# Patient Record
Sex: MALE | Race: WHITE | NOT HISPANIC OR LATINO | Employment: FULL TIME | URBAN - METROPOLITAN AREA
[De-identification: names, ages, dates, MRNs, and addresses within clinical notes are randomized per-mention and may not be internally consistent; named-entity substitution may affect disease eponyms.]

---

## 2023-04-19 ENCOUNTER — HOSPITAL ENCOUNTER (EMERGENCY)
Facility: HOSPITAL | Age: 53
Discharge: HOME | End: 2023-04-19
Attending: EMERGENCY MEDICINE | Admitting: EMERGENCY MEDICINE
Payer: COMMERCIAL

## 2023-04-19 ENCOUNTER — APPOINTMENT (OUTPATIENT)
Dept: MRI IMAGING | Facility: HOSPITAL | Age: 53
End: 2023-04-19
Payer: COMMERCIAL

## 2023-04-19 VITALS
HEIGHT: 69 IN | RESPIRATION RATE: 16 BRPM | BODY MASS INDEX: 28.57 KG/M2 | DIASTOLIC BLOOD PRESSURE: 78 MMHG | HEART RATE: 94 BPM | WEIGHT: 192.9 LBS | OXYGEN SATURATION: 95 % | TEMPERATURE: 98.1 F | SYSTOLIC BLOOD PRESSURE: 119 MMHG

## 2023-04-19 DIAGNOSIS — M54.2 NECK PAIN: Primary | ICD-10-CM

## 2023-04-19 PROCEDURE — 72141 MRI NECK SPINE W/O DYE: CPT

## 2023-04-19 PROCEDURE — 99282 EMERGENCY DEPT VISIT SF MDM: CPT

## 2023-04-19 RX ORDER — PREDNISONE 20 MG/1
20 TABLET ORAL DAILY
Qty: 5 TABLET | Refills: 0 | Status: SHIPPED | OUTPATIENT
Start: 2023-04-19 | End: 2023-05-01

## 2023-04-19 RX ORDER — TRAMADOL HYDROCHLORIDE 50 MG/1
50 TABLET ORAL EVERY 6 HOURS PRN
Qty: 12 TABLET | Refills: 0 | Status: SHIPPED | OUTPATIENT
Start: 2023-04-19 | End: 2023-05-01

## 2023-04-19 NOTE — ED PROVIDER NOTES
"Subjective    Provider in Triage Note  Patient is 53-year-old male who presents with right-sided neck pain and right arm pain for about 1 week.  Patient denies an injury.  Patient reports pain is worse at rest and that he \"feels fine with movement.\"  Patient reports numbness and tingling in the arm but denies reduced sensation or mobility.  Denies shortness of breath or chest pain.  Denies headache, lightheadedness, or dizziness.  Denies visual changes.  Denies fever, chills or recent illness.  Denies weakness.       History of Present Illness  I interviewed the patient for HPI and agree with the nurse practitioner providing triage note as noted above  Review of Systems    No past medical history on file.    No Known Allergies    No past surgical history on file.    No family history on file.    Social History     Socioeconomic History   • Marital status:            Objective   Physical Exam  Neurologic exam is nonfocal.  Patient has full range of motion of all extremities.  Examination patient's neck shows no C-spine tenderness.  Patient is 2+ distal pulses and is neurovascularly intact.  Procedures           ED Course          MRI Cervical Spine Without Contrast    Result Date: 4/19/2023  1. Moderate to severe central canal stenosis at C6-7, with mild indentation and flattening of the anterior cervical spinal cord, without cord edema. 2. Severe left neural foraminal stenosis at C3-4. Correlate radiculopathy symptoms in this distribution. 3. Varying degrees of neural foraminal stenosis elsewhere within the cervical spine. Please refer to the body of the report for level by level findings. Electronically Signed: Claudia Vasquez  4/19/2023 2:03 PM EDT  Workstation ID: [id number]                                      Medical Decision Making  Results of the patient's MRI scan of his neck are as noted above from the radiologist.  Patient be discharged.  Patient be placed on prednisone and Ultram.  I did speak to the " on-call neurosurgeon for follow-up.    Amount and/or Complexity of Data Reviewed  Radiology: ordered and independent interpretation performed.      Risk  Prescription drug management.          Final diagnoses:   Neck pain       ED Disposition  ED Disposition     ED Disposition   Discharge    Condition   Stable    Comment   --             No follow-up provider specified.       Medication List      New Prescriptions    predniSONE 20 MG tablet  Commonly known as: DELTASONE  Take 1 tablet by mouth Daily.     traMADol 50 MG tablet  Commonly known as: ULTRAM  Take 1 tablet by mouth Every 6 (Six) Hours As Needed for Severe Pain or Moderate Pain.           Where to Get Your Medications      These medications were sent to [id number] - [place], [place] - [address] AT [address] OF Replaced by [facility] - [phone]  - [phone]   [address] [place] [address]    Phone: [phone]   · predniSONE 20 MG tablet  · traMADol 50 MG tablet          Kermit Hurst MD  04/19/23 4552

## 2023-04-19 NOTE — Clinical Note
[facility] EMERGENCY DEPARTMENT  [address] [place] [address]  Phone: [phone]    Esequiel Becker was seen and treated in our emergency department on 4/19/2023.  He may return to work on 04/20/2023.         Thank you for choosing [facility].    Xin Murguia RN

## 2023-04-19 NOTE — ED NOTES
Pt. Presents with mother for c/o Right sided neck pain at a #7 with rest that radiates down right arm that began 4 days after lifting boxes at home.

## 2023-04-28 NOTE — PROGRESS NOTES
"Subjective   History of Present Illness: Esequiel Becker is a 53 y.o. male is being seen for consultation today at the request of Kermit Hurst MD for neck pain. Today patient reports right arm and neck pain as well as right shoulder pain.  Patient has a 3-week history of neck pain as well as pain that will radiate down his right upper extremity and into his second third and fourth digits.  This can be associated with numbness and tingling.  Patient was seen in the ER a couple weeks ago for this.  Pain is quite significant and causing him issues.  He has not tried any physical therapy or injections.  He has been taking over-the-counter pain medication.  No difficulty using his hands or dropping things.  No balance issues.  Chief Complaint   Patient presents with   • Neck Pain     New evaluation          Previous Treatment: Ibuprofen, Tylenol    The following portions of the patient's history were reviewed and updated as appropriate: allergies, current medications, past family history, past medical history, past social history, past surgical history and problem list.    Review of Systems   HENT: Negative.    Eyes: Negative.    Respiratory: Negative.    Cardiovascular: Negative.    Gastrointestinal: Negative.    Endocrine: Negative.    Genitourinary: Negative.    Musculoskeletal: Positive for arthralgias, myalgias, neck pain and neck stiffness.        Right arm   Skin: Negative.    Allergic/Immunologic: Negative.    Neurological: Positive for weakness and numbness (tingling).   Hematological: Negative.    Psychiatric/Behavioral: Positive for sleep disturbance.       Objective     /74   Pulse 82   Ht 175.3 cm (69\")   Wt 85.8 kg (189 lb 3.2 oz)   BMI 27.94 kg/m²    Body mass index is 27.94 kg/m².  Vitals:    05/01/23 1314   PainSc:   8           Neurologic Exam     Mental Status   Oriented to person, place, and time.     Motor Exam     Strength   Strength 5/5 throughout.     Sensory Exam   Light touch normal. "     Gait, Coordination, and Reflexes     Reflexes   Right Bazzi: absent  Left Bazzi: absent      Assessment & Plan   Independent Review of Radiographic Studies:      I personally reviewed and interpreted the images from the following studies.    MRI cervical spine: Left paracentral disc herniation at C3-4 causing moderate central stenosis and severe foraminal stenosis.  There is also significant degenerative changes at C6-7 with moderate to severe central stenosis and foraminal stenosis.  No other high-grade central or foraminal stenosis    Medical Decision Making:      Esequiel Becker is a 53 y.o. male with a 3-week history of neck pain and cervical radiculopathy with degenerative changes on MRI most severe at C6-7 where there is central and foraminal stenosis.  No urgent neurosurgical intervention is indicated at this time.  I recommend that the patient begin physical therapy as well as undergo cervical ERNESTO.  I think that his symptoms will likely resolve over the course of time.  Should his symptoms continue or worsen despite conservative measures he can follow-up with me.        Diagnoses and all orders for this visit:    1. Cervical radiculopathy (Primary)    2. Cervical stenosis of spine      No follow-ups on file.    This patient was examined wearing appropriate personal protective equipment.                      Dr. Rk Langley IV    05/01/23  13:38 EDT

## 2023-04-28 NOTE — PROGRESS NOTES
"Subjective   History of Present Illness: Esequiel Becker is a 53 y.o. male is here today for follow-up.    No chief complaint on file.         Previous Treatment:    The following portions of the patient's history were reviewed and updated as appropriate: {history reviewed:20406::\"allergies\",\"current medications\",\"past family history\",\"past medical history\",\"past social history\",\"past surgical history\",\"problem list\"}.    Review of Systems    Objective     There were no vitals taken for this visit.   There is no height or weight on file to calculate BMI.  There were no vitals filed for this visit.        Neurologic Exam    Assessment & Plan   Independent Review of Radiographic Studies:      I personally reviewed and interpreted the images from the following studies.    ***    Medical Decision Making:      Esequiel Becker is a 53 y.o. male       There are no diagnoses linked to this encounter.  No follow-ups on file.    This patient was examined wearing appropriate personal protective equipment.                      Dr. Rk Langley IV    04/28/23  09:39 EDT  "

## 2023-05-01 ENCOUNTER — OFFICE VISIT (OUTPATIENT)
Dept: NEUROSURGERY | Facility: CLINIC | Age: 53
End: 2023-05-01
Payer: COMMERCIAL

## 2023-05-01 VITALS
HEART RATE: 82 BPM | WEIGHT: 189.2 LBS | BODY MASS INDEX: 28.02 KG/M2 | HEIGHT: 69 IN | DIASTOLIC BLOOD PRESSURE: 74 MMHG | SYSTOLIC BLOOD PRESSURE: 132 MMHG

## 2023-05-01 DIAGNOSIS — M48.02 CERVICAL STENOSIS OF SPINE: ICD-10-CM

## 2023-05-01 DIAGNOSIS — M54.12 CERVICAL RADICULOPATHY: Primary | ICD-10-CM

## 2023-05-01 PROCEDURE — 99204 OFFICE O/P NEW MOD 45 MIN: CPT | Performed by: NEUROLOGICAL SURGERY

## 2023-05-03 ENCOUNTER — TELEPHONE (OUTPATIENT)
Dept: NEUROSURGERY | Facility: CLINIC | Age: 53
End: 2023-05-03
Payer: COMMERCIAL

## 2023-05-03 NOTE — TELEPHONE ENCOUNTER
Patient called the office today asking when he will be scheduled for his injections. I informed him that it sometimes takes a little bit, but as soon as they get the approval that they will call him to schedule his appointment. He is also complaining of pain and is wanting to know if there is anything that he can take to help. I informed him that I would call him back after I heard back from Dr. Langley.

## 2023-05-04 RX ORDER — METHYLPREDNISOLONE 4 MG/1
TABLET ORAL
Qty: 21 TABLET | Refills: 0 | Status: SHIPPED | OUTPATIENT
Start: 2023-05-04

## 2023-05-04 NOTE — TELEPHONE ENCOUNTER
Patient called the office following up on his call yesterday. I stated that we sent in a steroid pack to help with his pain. He was very grateful and understood.

## 2023-05-09 ENCOUNTER — TELEPHONE (OUTPATIENT)
Dept: NEUROSURGERY | Facility: CLINIC | Age: 53
End: 2023-05-09
Payer: COMMERCIAL

## 2023-05-09 RX ORDER — METHYLPREDNISOLONE 4 MG/1
TABLET ORAL
Qty: 21 TABLET | Refills: 0 | Status: SHIPPED | OUTPATIENT
Start: 2023-05-09

## 2023-05-09 NOTE — TELEPHONE ENCOUNTER
Called patient to let him know we sent his steroid pack to [facility] pharmacy on [address] like requested. Patient lost the call.

## 2023-05-09 NOTE — TELEPHONE ENCOUNTER
Patient called the office returning my call. I informed him that his medication was sent to [facility] on [address]

## 2023-05-09 NOTE — TELEPHONE ENCOUNTER
Patient called the office stating that his insurance will not cover his prescription at [facility]. He asked if we could switch his pharmacy for this medication to [facility]. I informed him that I would get that sent over as soon as possible.

## 2023-05-19 RX ORDER — MELOXICAM 15 MG/1
15 TABLET ORAL DAILY
Qty: 30 TABLET | Refills: 0 | Status: SHIPPED | OUTPATIENT
Start: 2023-05-19

## 2023-05-23 ENCOUNTER — TELEPHONE (OUTPATIENT)
Dept: NEUROSURGERY | Facility: CLINIC | Age: 53
End: 2023-05-23
Payer: COMMERCIAL

## 2023-05-23 DIAGNOSIS — M54.12 CERVICAL RADICULOPATHY: Primary | ICD-10-CM

## 2023-05-23 NOTE — TELEPHONE ENCOUNTER
Patient called the office stating that he is having pain and had to call off work. Neck pain that goes down bilateral arms down into his hands. Patient is wanting to be seen today and wants a doctors note.

## 2023-05-23 NOTE — TELEPHONE ENCOUNTER
Patient called the office again to check the status of being seen and a work note, advised the patient that per Dr. Langley's last OV he ordered ERNESTO & PT, those will need to be completed before we seen him back. I also let him know that we don't prescribe anything for pain unless they have had surgery and then it is a short course, he has had 2 recent MDP and prednisone within 60 days. He would like referral for an evaluation with pain mgmt physician for possible pain medication. Told him Dr. Langley is in surgery and we will let him know once he answers us back.

## 2023-05-25 ENCOUNTER — TREATMENT (OUTPATIENT)
Dept: PHYSICAL THERAPY | Facility: CLINIC | Age: 53
End: 2023-05-25
Payer: COMMERCIAL

## 2023-05-25 DIAGNOSIS — M54.12 CERVICAL RADICULOPATHY: Primary | ICD-10-CM

## 2023-05-25 DIAGNOSIS — M54.2 PAIN, NECK: ICD-10-CM

## 2023-05-25 PROCEDURE — 97110 THERAPEUTIC EXERCISES: CPT | Performed by: PHYSICAL THERAPIST

## 2023-05-25 PROCEDURE — 97162 PT EVAL MOD COMPLEX 30 MIN: CPT | Performed by: PHYSICAL THERAPIST

## 2023-05-25 PROCEDURE — 97035 APP MDLTY 1+ULTRASOUND EA 15: CPT | Performed by: PHYSICAL THERAPIST

## 2023-05-25 NOTE — PROGRESS NOTES
Physical Therapy Initial Evaluation and Plan of Care    Patient: Esequiel Becker   : 1970  Diagnosis/ICD-10 Code:  Cervical radiculopathy [M54.12]  Referring practitioner: Rk Langley IV, MD  Date of Initial Visit: 2023  Today's Date: 2023  Patient seen for 1 sessions           Subjective Questionnaire: NDI:48%      Subjective Evaluation    History of Present Illness  Mechanism of injury: Pt is referred to therapy due to neck pain and R UE symptoms. Pt co pain in R side of the neck , UTs/ R shoulder , posterior upper arm, elbow, down to the arm to the fingers.    Onset of symptoms: about 6 weeks ago    Aggravating factors: sleeping, unable to get comfortable at night, any type of physical activities, over head activities, work related activities, Turning his head    Relieving factors: nothing so far    Functional limitation: yard work, work, activities he needs to do around the house.    PMH: unremarkable          Patient Occupation: drives a fork lift Quality of life: good    Pain  Current pain ratin  At best pain ratin  At worst pain ratin  Progression: worsening    Social Support  Lives with: significant other    Hand dominance: right    Diagnostic Tests  MRI studies: abnormal    Treatments  Current treatment: medication  Patient Goals  Patient goals for therapy: decreased pain, increased motion, increased strength and return to sport/leisure activities           Precautions:     Objective          Postural Observations  Seated posture: fair  Standing posture: fair  Correction of posture: makes symptoms better    Additional Postural Observation Details  Presents with rounded shoulders/ FHP/ slump sitting posture    Palpation     Right Tenderness of the cervical paraspinals, levator scapulae, rhomboids and upper trapezius.     Active Range of Motion   Cervical/Thoracic Spine   Cervical    Flexion: WFL and with pain  Extension: WFL and with pain  Left lateral flexion: WFL and with  pain  Right lateral flexion: WFL and with pain  Left rotation: WFL and with pain  Right rotation: WFL and with pain    Strength/Myotome Testing   Cervical Spine     Left   Normal strength    Right   Normal strength    Left Wrist/Hand      (2nd hand position)   Left  strength (2nd hand position) 85 lbs    Right Wrist/Hand      (2nd hand position)   Right  strength (2nd hand position) 70 lbs    Tests   Cervical     Right   Positive cervical distraction and Spurling's sign.           Assessment & Plan     Assessment  Impairments: abnormal or restricted ROM, activity intolerance, lacks appropriate home exercise program and pain with function  Functional Limitations: carrying objects, lifting, sleeping, pulling, pushing, uncomfortable because of pain, sitting, reaching overhead and unable to perform repetitive tasks  Assessment details: Pt is a 53 y.o. male referred to therapy due to R sided neck pain and R UE radiculopathy. . Pt presents with impaired spinal mobility, increase pain with activities and use of R UE.   Upon initial evaluation pt exhibits the above impairments and functional limitations. Impairments affect sleeping, performing his normal /daily activities, and performing his work related activities.     Pt is a good candidate for rehab and will benefit from skilled physical therapy to address impairments, resolve pain and maximize function.    Prognosis: good    Goals  Plan Goals: STGs:  In 4 weeks    1- Pt will  report at least 25 % improvement in functional mobility and pain reduction   2- Pt will be independent with initial HEP   3- Pt will tolerate progression of his exercise program and HEP without increase symptoms  4- Pt will have less UE symptoms      LTGs: By DC     1- Pt will report at least 75 % improvement in functional mobility and pain reduction   2- Pt will be independent with final HEP and self management of his condition   3- Pt will have improved NDI score indicating  functional improvement and symptom reduction   4- Pt will voice readiness for DC with independent program   5- Pt will demonstrate improved posture and body mechanics   6- R UE symptoms will be resolved        Plan  Therapy options: will be seen for skilled therapy services  Planned modality interventions: high voltage pulsed current (pain management), ultrasound and traction  Planned therapy interventions: body mechanics training, functional ROM exercises, home exercise program, manual therapy, neuromuscular re-education, postural training, strengthening and therapeutic activities  Frequency: 1x week  Duration in weeks: 12  Treatment plan discussed with: patient  Plan details: Pt can only come to therapy 1xwk due to his work schedule        See flow sheet for treatment detail    History # of Personal Factors and/or Comorbidities: MODERATE (1-2)  Examination of Body System(s): # of elements: MODERATE (3)  Clinical Presentation: EVOLVING  Clinical Decision Making: MODERATE          Timed:         Manual Therapy:         mins  15573;     Therapeutic Exercise:   10     mins  02316;     Neuromuscular Hu:        mins  31054;    Therapeutic Activity:          mins  87612;     Gait Training:           mins  83374;     Ultrasound:    10      mins  20350;    Ionto                                   mins   39612  Self Care                       6    mins   87221        Un-Timed:  Electrical Stimulation:         mins  03403 ( );  Dry Needling          mins self-pay  Traction          mins 16959  Canal repositioning           mins    94868  Low Eval          Mins  55508  Mod Eval    30      Mins  87552  High Eval                            Mins  76293  Re-Eval                               mins  69166        Timed Treatment: 26     mins   Total Treatment:     56   mins    PT SIGNATURE: Franki Martinez PT, CLT  [place] License: # [id number]     DATE TREATMENT INITIATED: 5/25/2023    Initial Certification  Certification  Period: 5/25/2023 thru 8/22/2023  I certify that the therapy services are furnished while this patient is under my care.  The services outlined above are required by this patient, and will be reviewed every 90 days.     PHYSICIAN: Rk Langley IV, MD   NPI: [id number]                                      DATE:     Please sign and return via fax to [phone].. Thank you, [facility].

## 2023-06-03 ENCOUNTER — LAB (OUTPATIENT)
Dept: LAB | Facility: HOSPITAL | Age: 53
End: 2023-06-03
Payer: COMMERCIAL

## 2023-06-03 ENCOUNTER — TRANSCRIBE ORDERS (OUTPATIENT)
Dept: ADMINISTRATIVE | Facility: HOSPITAL | Age: 53
End: 2023-06-03
Payer: COMMERCIAL

## 2023-06-03 DIAGNOSIS — E78.5 HYPERLIPIDEMIA, UNSPECIFIED HYPERLIPIDEMIA TYPE: Primary | ICD-10-CM

## 2023-06-03 DIAGNOSIS — Z12.5 SCREENING FOR PROSTATE CANCER: ICD-10-CM

## 2023-06-03 DIAGNOSIS — R73.9 HYPERGLYCEMIA: ICD-10-CM

## 2023-06-03 DIAGNOSIS — E78.5 HYPERLIPIDEMIA, UNSPECIFIED HYPERLIPIDEMIA TYPE: ICD-10-CM

## 2023-06-03 LAB
ALBUMIN SERPL-MCNC: 4.9 G/DL (ref 3.5–5.2)
ALBUMIN UR-MCNC: 4.3 MG/DL
ALBUMIN/GLOB SERPL: 2.3 G/DL
ALP SERPL-CCNC: 55 U/L (ref 39–117)
ALT SERPL W P-5'-P-CCNC: 26 U/L (ref 1–41)
ANION GAP SERPL CALCULATED.3IONS-SCNC: 11 MMOL/L (ref 5–15)
AST SERPL-CCNC: 18 U/L (ref 1–40)
BASOPHILS # BLD AUTO: 0.08 10*3/MM3 (ref 0–0.2)
BASOPHILS NFR BLD AUTO: 1.1 % (ref 0–1.5)
BILIRUB SERPL-MCNC: 0.2 MG/DL (ref 0–1.2)
BUN SERPL-MCNC: 23 MG/DL (ref 6–20)
BUN/CREAT SERPL: 20.9 (ref 7–25)
CALCIUM SPEC-SCNC: 9.6 MG/DL (ref 8.6–10.5)
CHLORIDE SERPL-SCNC: 110 MMOL/L (ref 98–107)
CHOLEST SERPL-MCNC: 134 MG/DL (ref 0–200)
CO2 SERPL-SCNC: 28 MMOL/L (ref 22–29)
CREAT SERPL-MCNC: 1.1 MG/DL (ref 0.76–1.27)
CREAT UR-MCNC: 329 MG/DL
DEPRECATED RDW RBC AUTO: 42 FL (ref 37–54)
EGFRCR SERPLBLD CKD-EPI 2021: 80.8 ML/MIN/1.73
EOSINOPHIL # BLD AUTO: 0.34 10*3/MM3 (ref 0–0.4)
EOSINOPHIL NFR BLD AUTO: 4.5 % (ref 0.3–6.2)
ERYTHROCYTE [DISTWIDTH] IN BLOOD BY AUTOMATED COUNT: 12.3 % (ref 12.3–15.4)
GLOBULIN UR ELPH-MCNC: 2.1 GM/DL
GLUCOSE SERPL-MCNC: 93 MG/DL (ref 65–99)
HBA1C MFR BLD: 5.6 % (ref 4.8–5.6)
HCT VFR BLD AUTO: 45.1 % (ref 37.5–51)
HDLC SERPL-MCNC: 30 MG/DL (ref 40–60)
HGB BLD-MCNC: 15.3 G/DL (ref 13–17.7)
IMM GRANULOCYTES # BLD AUTO: 0.01 10*3/MM3 (ref 0–0.05)
IMM GRANULOCYTES NFR BLD AUTO: 0.1 % (ref 0–0.5)
LDLC SERPL CALC-MCNC: 72 MG/DL (ref 0–100)
LDLC/HDLC SERPL: 2.2 {RATIO}
LYMPHOCYTES # BLD AUTO: 1.95 10*3/MM3 (ref 0.7–3.1)
LYMPHOCYTES NFR BLD AUTO: 25.8 % (ref 19.6–45.3)
MCH RBC QN AUTO: 31.4 PG (ref 26.6–33)
MCHC RBC AUTO-ENTMCNC: 33.9 G/DL (ref 31.5–35.7)
MCV RBC AUTO: 92.6 FL (ref 79–97)
MICROALBUMIN/CREAT UR: 13.1 MG/G
MONOCYTES # BLD AUTO: 0.8 10*3/MM3 (ref 0.1–0.9)
MONOCYTES NFR BLD AUTO: 10.6 % (ref 5–12)
NEUTROPHILS NFR BLD AUTO: 4.37 10*3/MM3 (ref 1.7–7)
NEUTROPHILS NFR BLD AUTO: 57.9 % (ref 42.7–76)
NRBC BLD AUTO-RTO: 0 /100 WBC (ref 0–0.2)
PLATELET # BLD AUTO: 316 10*3/MM3 (ref 140–450)
PMV BLD AUTO: 9.3 FL (ref 6–12)
POTASSIUM SERPL-SCNC: 5.1 MMOL/L (ref 3.5–5.2)
PROT SERPL-MCNC: 7 G/DL (ref 6–8.5)
PSA SERPL-MCNC: 1.1 NG/ML (ref 0–4)
RBC # BLD AUTO: 4.87 10*6/MM3 (ref 4.14–5.8)
SODIUM SERPL-SCNC: 149 MMOL/L (ref 136–145)
TRIGL SERPL-MCNC: 190 MG/DL (ref 0–150)
VLDLC SERPL-MCNC: 32 MG/DL (ref 5–40)
WBC NRBC COR # BLD: 7.55 10*3/MM3 (ref 3.4–10.8)

## 2023-06-03 PROCEDURE — 80053 COMPREHEN METABOLIC PANEL: CPT

## 2023-06-03 PROCEDURE — 80061 LIPID PANEL: CPT

## 2023-06-03 PROCEDURE — 82570 ASSAY OF URINE CREATININE: CPT

## 2023-06-03 PROCEDURE — 83036 HEMOGLOBIN GLYCOSYLATED A1C: CPT

## 2023-06-03 PROCEDURE — 82043 UR ALBUMIN QUANTITATIVE: CPT

## 2023-06-03 PROCEDURE — G0103 PSA SCREENING: HCPCS

## 2023-06-03 PROCEDURE — 85025 COMPLETE CBC W/AUTO DIFF WBC: CPT

## 2023-06-03 PROCEDURE — 36415 COLL VENOUS BLD VENIPUNCTURE: CPT

## 2023-06-06 ENCOUNTER — OFFICE VISIT (OUTPATIENT)
Dept: PAIN MEDICINE | Facility: CLINIC | Age: 53
End: 2023-06-06
Payer: COMMERCIAL

## 2023-06-06 VITALS
RESPIRATION RATE: 16 BRPM | DIASTOLIC BLOOD PRESSURE: 82 MMHG | HEART RATE: 86 BPM | OXYGEN SATURATION: 96 % | SYSTOLIC BLOOD PRESSURE: 140 MMHG

## 2023-06-06 DIAGNOSIS — G89.4 CHRONIC PAIN SYNDROME: Primary | ICD-10-CM

## 2023-06-06 DIAGNOSIS — M54.12 CERVICAL RADICULITIS: ICD-10-CM

## 2023-06-06 DIAGNOSIS — M47.812 CERVICAL SPONDYLOSIS WITHOUT MYELOPATHY: ICD-10-CM

## 2023-06-06 DIAGNOSIS — M79.18 MYOFASCIAL PAIN SYNDROME: ICD-10-CM

## 2023-06-06 PROCEDURE — 99204 OFFICE O/P NEW MOD 45 MIN: CPT | Performed by: ANESTHESIOLOGY

## 2023-06-06 RX ORDER — DICLOFENAC SODIUM 75 MG/1
75 TABLET, DELAYED RELEASE ORAL 2 TIMES DAILY
Qty: 60 TABLET | Refills: 1 | Status: SHIPPED | OUTPATIENT
Start: 2023-06-06

## 2023-06-06 RX ORDER — GABAPENTIN 300 MG/1
300 CAPSULE ORAL 3 TIMES DAILY PRN
Qty: 90 CAPSULE | Refills: 1 | Status: SHIPPED | OUTPATIENT
Start: 2023-06-06

## 2023-06-06 NOTE — PROGRESS NOTES
Subjective   CC neck pain, right arm pain  Esequiel Becker is a 53 y.o. male with chronic neck pain here for initial evaluation.  Referred by neurosurgery.  Complains of 6 weeks of worsening neck pain mostly right-sided radiating to right arm with burning tingling numbness in the arm constant but worse with any activity.  Denies balance issues, bladder bowel continence.  He has tried anti-inflammatories muscle relaxers without relief.  Tried physical therapy, home exercise program with marginal relief so far.  Pain significantly impairing ADL, interfering with work.  Seen neurosurgery recommended physical therapy and injection first.    Imaging reviewed  C-spine MRI : moderate to severe central canal stenosis at C6-7, with mild indentation and flattening of the anterior cervical spinal cord, without cord edema. Severe left neural foraminal stenosis at C3-4. Correlate radiculopathy symptoms in this distribution. Varying degrees of neural foraminal stenosis elsewhere within the cervical spine. Please refer to the body of the report for level by level findings.    Pain Assessment   Location of Pain: Right arm neck pain, joint  Description of Pain: Dull/Aching, Throbbing, Stabbing  Previous Pain Rating :7  Current Pain Ratin  Aggravating Factors: Activity  Alleviating Factors: Rest, Medication  Pain onset over 12 weeks  Interferes with ADL's.   [place] back pain disability scale on chart    PEG Assessment   What number best describes your pain on average in the past week?7  What number best describes how, during the past week, pain has interfered with your enjoyment of life?4  What number best describes how, during the past week, pain has interfered with your general activity?10      The following portions of the patient's history were reviewed and updated as appropriate: allergies, current medications, past family history, past medical history, past social history, past surgical history and problem list.      has a past medical history of Neck pain (04/15/23), Shoulder pain, right, and Spinal stenosis (04/15/23).   has a past surgical history that includes Brain surgery (04/13/01).  family history includes Arthritis in his mother; COPD in his maternal grandfather.  Social History     Tobacco Use    Smoking status: Every Day     Types: Electronic Cigarette    Smokeless tobacco: Not on file   Substance Use Topics    Alcohol use: Not Currently       Review of Systems    Objective   Physical Exam  Vitals reviewed.   Constitutional:       General: He is not in acute distress.  Pulmonary:      Effort: Pulmonary effort is normal.   Musculoskeletal:      Cervical back: Spasms and tenderness present. Decreased range of motion.      Comments: Positive per Spurling     /82   Pulse 86   Resp 16   SpO2 96%     PHQ 9 on chart  Opioid risk tool low risk      Assessment & Plan   Diagnoses and all orders for this visit:    1. Chronic pain syndrome (Primary)  -     diclofenac (VOLTAREN) 75 MG EC tablet; Take 1 tablet by mouth 2 (Two) Times a Day.  Dispense: 60 tablet; Refill: 1  -     gabapentin (NEURONTIN) 300 MG capsule; Take 1 capsule by mouth 3 (Three) Times a Day As Needed (pain).  Dispense: 90 capsule; Refill: 1    2. Cervical spondylosis without myelopathy    3. Cervical radiculitis  -     Epidural Block  -     Epidural Block  -     gabapentin (NEURONTIN) 300 MG capsule; Take 1 capsule by mouth 3 (Three) Times a Day As Needed (pain).  Dispense: 90 capsule; Refill: 1    4. Myofascial pain syndrome      Summary  Esequiel Becker is a 53 y.o. male with chronic neck pain here for initial evaluation.  Referred by neurosurgery.  Complains of 6 weeks of worsening neck pain mostly right-sided radiating to right arm with burning tingling numbness in the arm constant but worse with any activity.  Denies balance issues, bladder bowel continence.  He has tried anti-inflammatories muscle relaxers without relief.  Tried physical  therapy, home exercise program with marginal relief so far.  Pain significantly impairing ADL, interfering with work.  Seen neurosurgery recommended physical therapy and injection first.    Worsening neck pain from DDD spondylosis stenosis with radicular pain.  Marginal relief with medication and physical therapy/home exercise program.  We will schedule for cervical ERNESTO x2.  Risks and benefits discussed.    Diclofenac and gabapentin.    RTC for procedure      Note is dictated utilizing voice recognition software. Unfortunately this leads to occasional typographical errors. I apologize in advance if the situation occurs. If questions occur please do not hesitate to call our office.

## 2023-06-08 ENCOUNTER — TREATMENT (OUTPATIENT)
Dept: PHYSICAL THERAPY | Facility: CLINIC | Age: 53
End: 2023-06-08
Payer: COMMERCIAL

## 2023-06-08 DIAGNOSIS — M54.12 CERVICAL RADICULOPATHY: Primary | ICD-10-CM

## 2023-06-08 DIAGNOSIS — M54.2 PAIN, NECK: ICD-10-CM

## 2023-06-08 NOTE — PROGRESS NOTES
Physical Therapy Daily Treatment Note    [address]  [place] [address]  [phone]    Patient: Esequiel Becker  : 1970  Referring practitioner: Rk Langley IV, MD  Diagnosis/ ICD10 code: Cervical radiculopathy [M54.12]  Today's Date: 2023    VISIT#: 2    Subjective   Pt reports: went to pain management on Tuesday and she put him on Gabapentin it has helped. Sleeping better at night. She also scheduled him for injections in July. The US felt really good , felt better that day and that night. Presently having pain R Upper trapz/ shld blade and R elbow.       Objective     See Exercise, Manual, and Modality Logs for complete treatment. Review of his HEP,  postural ed , continued with US and manual therapy.      Patient Education:    Assessment & Plan     Assessment    Assessment details: Pt tolerated today's rx session well. He was uncomfortable in supine position during manual traction, co pain in R shoulder blade and R arm while in that position. Manual traction helped.         Progress per Plan of Care            Timed:         Manual Therapy:   12      mins  74430;     Therapeutic Exercise:     14    mins  29828;     Neuromuscular Hu:        mins  27967;    Therapeutic Activity:          mins  77992;     Gait Training:           mins  50437;     Ultrasound:      12    mins  24498;    Ionto                                   mins   01749  Self Care                            mins   46920      Un-Timed:  Electrical Stimulation:         mins  17411 ( );  Traction          mins 49176  Canal repositioning           mins    95488        Timed Treatment:   38   mins   Total Treatment:     38   mins    Franki Martinez PT, CLT  Physical Therapist  [place] License:  # [id number]

## 2023-06-16 ENCOUNTER — TREATMENT (OUTPATIENT)
Dept: PHYSICAL THERAPY | Facility: CLINIC | Age: 53
End: 2023-06-16
Payer: COMMERCIAL

## 2023-06-16 DIAGNOSIS — M54.12 CERVICAL RADICULOPATHY: Primary | ICD-10-CM

## 2023-06-16 DIAGNOSIS — M54.2 PAIN, NECK: ICD-10-CM

## 2023-06-16 PROCEDURE — 97140 MANUAL THERAPY 1/> REGIONS: CPT | Performed by: PHYSICAL THERAPIST

## 2023-06-16 PROCEDURE — 97035 APP MDLTY 1+ULTRASOUND EA 15: CPT | Performed by: PHYSICAL THERAPIST

## 2023-06-16 NOTE — PROGRESS NOTES
Physical Therapy Daily Treatment Note     [facility], [address]  [place] [address]  [phone]    Patient: Esequiel Becker  : 1970  Referring practitioner: Rk Langley IV, MD  Diagnosis/ ICD10 code: Cervical radiculopathy [M54.12]  Today's Date: 2023    VISIT#: 3    Subjective   Pt reports: is not doing too bad today, has bad and good days. Yesterday was really bad the pain was going down the R arm . Presently rates his pain 5/10 in the R shoulder blade. US feels really good .       Objective     See Exercise, Manual, and Modality Logs for complete treatment. Continued with US and manual therapy. Review of his HEP.     Patient Education:    Assessment & Plan     Assessment    Assessment details: Pt reported decrease pain in R shoulder blade after the manual therapy. Demonstrates understanding of his HEP.         Progress per Plan of Care            Timed:         Manual Therapy:   12     mins  52147;     Therapeutic Exercise:     5    mins  30698;     Neuromuscular Hu:        mins  95600;    Therapeutic Activity:          mins  23499;     Gait Training:           mins  39140;     Ultrasound:     12     mins  16227;    Ionto                                   mins   03957  Self Care                            mins   90927      Un-Timed:  Electrical Stimulation:         mins  96210 ( );  Traction          mins 70879  Canal repositioning           mins    05650        Timed Treatment:  29    mins   Total Treatment:     29   mins    Franki Martinez PT, CLT  Physical Therapist  [place] License:  # [id number]

## 2023-07-21 ENCOUNTER — TELEPHONE (OUTPATIENT)
Dept: PHYSICAL THERAPY | Facility: CLINIC | Age: 53
End: 2023-07-21

## 2023-07-28 ENCOUNTER — TREATMENT (OUTPATIENT)
Dept: PHYSICAL THERAPY | Facility: CLINIC | Age: 53
End: 2023-07-28
Payer: COMMERCIAL

## 2023-07-28 DIAGNOSIS — M54.2 PAIN, NECK: ICD-10-CM

## 2023-07-28 DIAGNOSIS — M54.12 CERVICAL RADICULOPATHY: Primary | ICD-10-CM

## 2023-07-28 PROCEDURE — 97140 MANUAL THERAPY 1/> REGIONS: CPT | Performed by: PHYSICAL THERAPIST

## 2023-07-28 PROCEDURE — 97035 APP MDLTY 1+ULTRASOUND EA 15: CPT | Performed by: PHYSICAL THERAPIST

## 2023-07-28 NOTE — PROGRESS NOTES
Physical Therapy Daily Treatment Note    [address] [facility], [address]  [place] [address]  [phone]    Patient: Esequiel Becker  : 1970  Referring practitioner: Rk Langley IV, MD  Diagnosis/ ICD10 code: Cervical radiculopathy [M54.12]  Today's Date: 2023    VISIT#: 7    Subjective   Pt reports: doing a lot better since he started therapy. Having his 2nd epidural next Friday. Some days he doesn't have any pain at all but occasionally may have some symptoms in R hand and elbow.       Objective     See Exercise, Manual, and Modality Logs for complete treatment.     Patient Education:    Assessment & Plan     Assessment    Assessment details: Overall doing better and responding to therapy. Has responded well to epidural injection.         Progress per Plan of Care            Timed:         Manual Therapy:  15       mins  38762;     Therapeutic Exercise:         mins  28653;     Neuromuscular Hu:        mins  16681;    Therapeutic Activity:          mins  86954;     Gait Training:           mins  02893;     Ultrasound:    12      mins  00571;    Ionto                                   mins   57117  Self Care                            mins   65706      Un-Timed:  Electrical Stimulation:         mins  25735 ( );  Traction          mins 58684  Canal repositioning           mins    82873        Timed Treatment:  27    mins   Total Treatment:    27    mins    Franki Martinez PT, CLT  Physical Therapist  [place] License:  # [id number]

## 2023-08-04 ENCOUNTER — HOSPITAL ENCOUNTER (OUTPATIENT)
Dept: PAIN MEDICINE | Facility: HOSPITAL | Age: 53
Discharge: HOME | End: 2023-08-04
Payer: COMMERCIAL

## 2023-08-04 VITALS
SYSTOLIC BLOOD PRESSURE: 137 MMHG | HEART RATE: 76 BPM | HEIGHT: 69 IN | RESPIRATION RATE: 16 BRPM | DIASTOLIC BLOOD PRESSURE: 81 MMHG | BODY MASS INDEX: 26.07 KG/M2 | TEMPERATURE: 97.5 F | OXYGEN SATURATION: 98 % | WEIGHT: 176 LBS

## 2023-08-04 DIAGNOSIS — R52 PAIN: ICD-10-CM

## 2023-08-04 DIAGNOSIS — M54.12 CERVICAL RADICULITIS: Primary | ICD-10-CM

## 2023-08-04 PROCEDURE — 25510000001 IOPAMIDOL 41 % SOLUTION: Performed by: ANESTHESIOLOGY

## 2023-08-04 PROCEDURE — 77003 FLUOROGUIDE FOR SPINE INJECT: CPT

## 2023-08-04 PROCEDURE — 62321 NJX INTERLAMINAR CRV/THRC: CPT | Performed by: ANESTHESIOLOGY

## 2023-08-04 PROCEDURE — 25010000002 METHYLPREDNISOLONE PER 40 MG: Performed by: ANESTHESIOLOGY

## 2023-08-04 RX ORDER — METHYLPREDNISOLONE ACETATE 40 MG/ML
40 INJECTION, SUSPENSION INTRA-ARTICULAR; INTRALESIONAL; INTRAMUSCULAR; SOFT TISSUE ONCE
Status: COMPLETED | OUTPATIENT
Start: 2023-08-04 | End: 2023-08-04

## 2023-08-04 RX ADMIN — IOPAMIDOL 3 ML: 408 INJECTION, SOLUTION INTRATHECAL at 09:57

## 2023-08-04 RX ADMIN — METHYLPREDNISOLONE ACETATE 40 MG: 40 INJECTION, SUSPENSION INTRA-ARTICULAR; INTRALESIONAL; INTRAMUSCULAR; INTRASYNOVIAL; SOFT TISSUE at 09:58

## 2023-08-04 NOTE — PROCEDURES
"Subjective   CC neck pain  Esequiel Becker is a 53 y.o. male cervical radiculitis  here for repeat ESTER.  No anticoagulation    Pain Assessment   Location of Pain: , neck pain, joint  Description of Pain: Dull/Aching, Throbbing, Stabbing  Previous Pain Rating :5  Current Pain Ratin  Aggravating Factors: Activity  Alleviating Factors: Rest, Medication  Pain onset over 12 weeks ago  Pain interferes with ADL's  [place] back pain disability scale scanned in chart     The following portions of the patient's history were reviewed and updated as appropriate: allergies, current medications, past family history, past medical history, past social history, past surgical history and problem list.      Review of Systems  As in HPI  Objective   Physical Exam  Vitals reviewed.   Constitutional:       General: He is not in acute distress.  Pulmonary:      Effort: Pulmonary effort is normal.     /86 (BP Location: Right arm, Patient Position: Sitting)   Pulse 80   Temp 97.5 øF (36.4 øC) (Skin)   Resp 16   Ht 175.3 cm (69\")   Wt 79.8 kg (176 lb)   SpO2 97%   BMI 25.99 kg/mý     Assessment & Plan    Underwent repeat ESTER    RTC 4-6 weeks or as needed for repeat    DATE OF PROCEDURE:  2023    PREOPERATIVE DIAGNOSIS:Cervical radiculitis    POSTOPERATIVE DIAGNOSIS: same    PROCEDURE PERFORMED:  cervical Epidural Steroid Injection    The patient presents with a history of   cervical degenerative disc disease  with  radiculitis. The patient presents today for a  cervical  epidural steroid injection at level C6/7. The patient understands the risks and benefits of the procedure and wishes to proceed.  The patient was seen in the preoperative area.  Patient's consent was obtained and updated.  Vitals were taken.  Patient was then brought to the procedure suite and placed in a prone position. The appropriate anatomic area was widely prepped with Chloraprep and draped in a sterile fashion. Noninvasive monitoring per routine " anesthesia protocol was placed.  Under fluoroscopic guidance using  AP view a 20 gauge styleted tuohy needle was passed through skin anesthetized with 1% Lidocaine without epinephrine.  The needle was advanced using the continuous loss of resistance to saline technique into the C6 epidural space. Needle tip placement in the epidural space was confirmed by loss of resistance and injection of 1 mL of  preservative free contrast.  Following this 7 mL of a solution containing  1 mL of 40 mg Depo-Medrol and 7 mL of preservative-free saline was carefully administered in the epidural space. Epidurogram following injection demonstrated dye spread as high as C4 and as low as T1. A sterile dressing was placed over the puncture site.    The patient tolerated the procedure with no complications . They were then brought to the post procedure area where they recovered nicely.

## 2023-08-04 NOTE — DISCHARGE INSTRUCTIONS

## 2023-08-05 DIAGNOSIS — G89.4 CHRONIC PAIN SYNDROME: ICD-10-CM

## 2023-08-07 RX ORDER — DICLOFENAC SODIUM 75 MG/1
TABLET, DELAYED RELEASE ORAL
Qty: 60 TABLET | Refills: 1 | Status: SHIPPED | OUTPATIENT
Start: 2023-08-07

## 2023-08-11 ENCOUNTER — TREATMENT (OUTPATIENT)
Dept: PHYSICAL THERAPY | Facility: CLINIC | Age: 53
End: 2023-08-11
Payer: COMMERCIAL

## 2023-08-11 DIAGNOSIS — M54.12 CERVICAL RADICULOPATHY: Primary | ICD-10-CM

## 2023-08-11 PROCEDURE — 97035 APP MDLTY 1+ULTRASOUND EA 15: CPT | Performed by: PHYSICAL THERAPIST

## 2023-08-11 PROCEDURE — 97140 MANUAL THERAPY 1/> REGIONS: CPT | Performed by: PHYSICAL THERAPIST

## 2023-08-11 NOTE — PROGRESS NOTES
Physical Therapy Daily Treatment Note    [facility] [facility], [address]  [place] [address]  [phone]    Patient: Esequiel Becker  : 1970  Referring practitioner: Rk Langley IV, MD  Diagnosis/ ICD10 code: Cervical radiculopathy [M54.12]  Today's Date: 2023    VISIT#: 8    Subjective   Pt reports: had his 2nd epidural last Friday. Doing a lot better. Had a few mild episodes of tingling sensation in R arm today lasting for just a few min. He is changing job and his insurance may change so he may not be able to complete his PT appointments.       Objective     See Exercise, Manual, and Modality Logs for complete treatment.     Patient Education:    Assessment & Plan     Assessment    Assessment details: Has responded well to therapy and epidural injections.         Progress per Plan of Care            Timed:         Manual Therapy:   14      mins  89571;     Therapeutic Exercise:     5    mins  46957;     Neuromuscular Hu:        mins  53482;    Therapeutic Activity:          mins  48578;     Gait Training:           mins  24506;     Ultrasound:    12      mins  57117;    Ionto                                   mins   84759  Self Care                            mins   22466      Un-Timed:  Electrical Stimulation:         mins  64882 ( );  Traction          mins 48736  Canal repositioning           mins    18191        Timed Treatment:  31    mins   Total Treatment:     31   mins    Franki Martinez PT, CLT  Physical Therapist  [place] License:  # [id number]

## 2023-08-18 ENCOUNTER — TELEPHONE (OUTPATIENT)
Dept: PHYSICAL THERAPY | Facility: CLINIC | Age: 53
End: 2023-08-18